# Patient Record
Sex: FEMALE | Race: AMERICAN INDIAN OR ALASKA NATIVE
[De-identification: names, ages, dates, MRNs, and addresses within clinical notes are randomized per-mention and may not be internally consistent; named-entity substitution may affect disease eponyms.]

---

## 2018-02-16 ENCOUNTER — HOSPITAL ENCOUNTER (OUTPATIENT)
Dept: HOSPITAL 5 - TRG | Age: 33
Discharge: HOME | End: 2018-02-16
Attending: OBSTETRICS & GYNECOLOGY
Payer: COMMERCIAL

## 2018-02-16 VITALS — SYSTOLIC BLOOD PRESSURE: 105 MMHG | DIASTOLIC BLOOD PRESSURE: 78 MMHG

## 2018-02-16 DIAGNOSIS — O46.92: Primary | ICD-10-CM

## 2018-02-16 DIAGNOSIS — Z3A.27: ICD-10-CM

## 2018-02-16 LAB
BACTERIA #/AREA URNS HPF: (no result) /HPF
BILIRUB UR QL STRIP: (no result)
BLOOD UR QL VISUAL: (no result)
HYALINE CASTS #/AREA URNS LPF: 5 /LPF
NITRITE UR QL STRIP: (no result)
PH UR STRIP: 6 [PH] (ref 5–7)
PROT UR STRIP-MCNC: (no result) MG/DL
RBC #/AREA URNS HPF: 4 /HPF (ref 0–6)
UROBILINOGEN UR-MCNC: 2 MG/DL (ref ?–2)
WBC #/AREA URNS HPF: 1 /HPF (ref 0–6)

## 2018-02-16 PROCEDURE — 81001 URINALYSIS AUTO W/SCOPE: CPT

## 2018-02-16 PROCEDURE — 59025 FETAL NON-STRESS TEST: CPT

## 2018-05-28 ENCOUNTER — HOSPITAL ENCOUNTER (EMERGENCY)
Dept: HOSPITAL 5 - ED | Age: 33
Discharge: HOME | End: 2018-05-28
Payer: COMMERCIAL

## 2018-05-28 VITALS — DIASTOLIC BLOOD PRESSURE: 94 MMHG | SYSTOLIC BLOOD PRESSURE: 149 MMHG

## 2018-05-28 DIAGNOSIS — J45.909: ICD-10-CM

## 2018-05-28 DIAGNOSIS — N30.01: ICD-10-CM

## 2018-05-28 DIAGNOSIS — R11.2: ICD-10-CM

## 2018-05-28 DIAGNOSIS — R07.89: ICD-10-CM

## 2018-05-28 DIAGNOSIS — K80.20: Primary | ICD-10-CM

## 2018-05-28 DIAGNOSIS — I10: ICD-10-CM

## 2018-05-28 LAB
ALBUMIN SERPL-MCNC: 4.3 G/DL (ref 3.9–5)
ALT SERPL-CCNC: 17 UNITS/L (ref 7–56)
BACTERIA #/AREA URNS HPF: (no result) /HPF
BASOPHILS # (AUTO): 0 K/MM3 (ref 0–0.1)
BASOPHILS NFR BLD AUTO: 0.3 % (ref 0–1.8)
BILIRUB UR QL STRIP: (no result)
BLOOD UR QL VISUAL: (no result)
BUN SERPL-MCNC: 11 MG/DL (ref 7–17)
BUN/CREAT SERPL: 16 %
CALCIUM SERPL-MCNC: 8.7 MG/DL (ref 8.4–10.2)
EOSINOPHIL # BLD AUTO: 0 K/MM3 (ref 0–0.4)
EOSINOPHIL NFR BLD AUTO: 0.6 % (ref 0–4.3)
HCT VFR BLD CALC: 39.4 % (ref 30.3–42.9)
HEMOLYSIS INDEX: 9
HGB BLD-MCNC: 13.1 GM/DL (ref 10.1–14.3)
LYMPHOCYTES # BLD AUTO: 1.8 K/MM3 (ref 1.2–5.4)
LYMPHOCYTES NFR BLD AUTO: 27.2 % (ref 13.4–35)
MCH RBC QN AUTO: 27 PG (ref 28–32)
MCHC RBC AUTO-ENTMCNC: 33 % (ref 30–34)
MCV RBC AUTO: 80 FL (ref 79–97)
MONOCYTES # (AUTO): 0.3 K/MM3 (ref 0–0.8)
MONOCYTES % (AUTO): 4.6 % (ref 0–7.3)
MUCOUS THREADS #/AREA URNS HPF: (no result) /HPF
PH UR STRIP: 5 [PH] (ref 5–7)
PLATELET # BLD: 283 K/MM3 (ref 140–440)
PROT UR STRIP-MCNC: (no result) MG/DL
RBC # BLD AUTO: 4.91 M/MM3 (ref 3.65–5.03)
RBC #/AREA URNS HPF: 4 /HPF (ref 0–6)
UROBILINOGEN UR-MCNC: 4 MG/DL (ref ?–2)
WBC #/AREA URNS HPF: 8 /HPF (ref 0–6)

## 2018-05-28 PROCEDURE — 87086 URINE CULTURE/COLONY COUNT: CPT

## 2018-05-28 PROCEDURE — 96372 THER/PROPH/DIAG INJ SC/IM: CPT

## 2018-05-28 PROCEDURE — 76705 ECHO EXAM OF ABDOMEN: CPT

## 2018-05-28 PROCEDURE — 84702 CHORIONIC GONADOTROPIN TEST: CPT

## 2018-05-28 PROCEDURE — 85025 COMPLETE CBC W/AUTO DIFF WBC: CPT

## 2018-05-28 PROCEDURE — 74019 RADEX ABDOMEN 2 VIEWS: CPT

## 2018-05-28 PROCEDURE — 96365 THER/PROPH/DIAG IV INF INIT: CPT

## 2018-05-28 PROCEDURE — 99284 EMERGENCY DEPT VISIT MOD MDM: CPT

## 2018-05-28 PROCEDURE — 80053 COMPREHEN METABOLIC PANEL: CPT

## 2018-05-28 PROCEDURE — 36415 COLL VENOUS BLD VENIPUNCTURE: CPT

## 2018-05-28 PROCEDURE — 81001 URINALYSIS AUTO W/SCOPE: CPT

## 2018-05-28 NOTE — EMERGENCY DEPARTMENT REPORT
Chief Complaint: Abdominal Pain


Stated Complaint: ABDOMINAL PAIN


Time Seen by Provider: 05/28/18 10:21





- HPI


History of Present Illness: 


32-year-old  female presents to the emergency department with 

complaint of upper abdominal pain since last night.  She had one episode of 

nausea and vomiting.  The pain started off in the lower portion of her chest 

but that has resolved and now it is just in the abdomen.  She has not taken 

anything for her symptoms have visitation.  She is 6 weeks postpartum.  She has 

a history of preeclampsia and hypertension but has not taken her medication yet 

today.  She denies any vaginal bleeding or vaginal discharge, dysuria, fever.  

No recent travel or sick contacts at home.  No history of any intra-abdominal 

surgery.








- ROS


Review of Systems: 


Positive for abdominal pain, nausea, vomiting


Negative for vaginal bleeding or discharge, dysuria or fever








- Exam


Vital Signs: 


 Vital Signs











  05/28/18 05/28/18





  09:12 10:20


 


Temperature 97.6 F 


 


Pulse Rate 87 


 


Respiratory 20 17





Rate  


 


Blood Pressure 170/99 


 


O2 Sat by Pulse 100 





Oximetry  











Physical Exam: 


Heart and lungs sounds are normal to auscultation.  She has upper abdominal 

tenderness to palpation but no guarding.





MSE screening note: 


Focused history and physical exam performed.


Due to findings the following was ordered:





Labs so far been unremarkable including a CBC and CMP.  Patient is not 

pregnant.  Awaiting urinalysis.  We'll obtain an upper abdominal ultrasound and 

a 2 view abdominal x-ray.  EKG is normal without ST elevation MI, ischemia or 

dysrhythmia.





ED Medical Decision Making





- Lab Data


Result diagrams: 


 05/28/18 09:25





 05/28/18 09:25





ED Disposition for MSE


Condition: Stable


Instructions:  Abdominal Pain (ED)


Referrals: 


PRABHA MCGHEE MD [Primary Care Provider] - 3-5 Days

## 2018-05-28 NOTE — EMERGENCY DEPARTMENT REPORT
ED Abdominal Pain HPI





- General


Chief Complaint: Abdominal Pain


Stated Complaint: ABDOMINAL PAIN


Time Seen by Provider: 05/28/18 10:21


Source: patient


Mode of arrival: Ambulatory


Limitations: No Limitations





- History of Present Illness


Initial Comments: 





32-year-old  female presents to the emergency department with 

complaint of upper abdominal pain since last night.  She had one episode of 

nausea and vomiting.  The pain started off in the lower portion of her chest 

but that has resolved and now it is just in the right mid to upper abdomen.  

Abdomen.  Pain feels stabbing in without any radiation.  Pain is 10 out of 10 

and intermittent.  Reports nausea without any vomiting.  Denies any fever or 

chills patient that all of this started after she ate dinner at [employer].  She is also complaining of constipation.  She has not taken anything 

for her symptoms have visitation.  She is 6 weeks postpartum status post 

vaginal delivery.  She has a history of preeclampsia and hypertension but has 

not taken her medication yet today.  She denies any vaginal bleeding or vaginal 

discharge, dysuria, fever.  No recent travel or sick contacts at home.  No 

history of any intra-abdominal surgery.  Denies any shortness of breath or 

chest pain.  Denies any swelling to legs.  Denies any headache or dizziness.





MD Complaint: abdominal pain


-: Last night


Location: RUQ, epigastric


Radiation: none


Migration to: no migration


Severity: severe


Severity scale (0 -10): 10


Quality: sharp


Consistency: intermittent


Improves With: nothing


Worsens With: eating


Context: possible food poisoning


Associated Symptoms: nausea, vomiting, constipation.  denies: diarrhea, fever, 

chills, dysuria, hematemesis, hematochezia, melena, hematuria, anorexia, syncope


Treatments Prior to Arrival: other (none)





- Related Data


LMP Date: 05/28/18


LMP (females 10-50): other (6 weeks postpartum)


 Home Medications











 Medication  Instructions  Recorded  Confirmed  Last Taken


 


Pnv No.95/Ferrous Fum/Folic AC 1 tab PO DAILY 02/16/18 02/16/18 02/13/18





[Prenatal Vitamins Tablet]    








 Previous Rx's











 Medication  Instructions  Recorded  Last Taken  Type


 


Acetaminophen [Non-Aspirin Pain 500 mg PO Q6H PRN #12 tablet 05/28/18 Unknown Rx





Relief]    


 


Ciprofloxacin HCl [Cipro] 500 mg PO Q12H 10 Days #20 tablet 05/28/18 Unknown Rx


 


Promethazine [Phenergan TAB] 25 mg PO Q6HR PRN #12 tab 05/28/18 Unknown Rx











 Allergies











Allergy/AdvReac Type Severity Reaction Status Date / Time


 


ibuprofen AdvReac Severe Anaphylaxis Verified 05/28/18 13:42














ED Review of Systems


ROS: 


Stated complaint: ABDOMINAL PAIN


Other details as noted in HPI





Constitutional: denies: chills, fever


Eyes: denies: eye pain, eye discharge, vision change


ENT: denies: ear pain, throat pain


Respiratory: denies: cough, shortness of breath, SOB with exertion, SOB at rest

, stridor, wheezing


Cardiovascular: denies: chest pain, palpitations, edema, syncope


Gastrointestinal: abdominal pain, nausea, vomiting.  denies: diarrhea, 

constipation, hematemesis, melena, hematochezia


Genitourinary: denies: urgency, dysuria, frequency, hematuria, discharge


Musculoskeletal: denies: back pain, joint swelling, arthralgia


Skin: denies: rash, lesions


Neurological: denies: headache, weakness, numbness, paresthesias, confusion, 

abnormal gait, vertigo





ED Past Medical Hx





- Past Medical History


Previous Medical History?: Yes


Hx Hypertension: Yes


Hx Diabetes: No


Hx Deep Vein Thrombosis: No


Hx Renal Disease: No


Hx Sickle Cell Disease: No


Hx Seizures: No


Hx Asthma: Yes (NO INHALER)


Hx HIV: No





- Surgical History


Past Surgical History?: No





- Family History


Family history: hypertension





- Social History


Smoking Status: Never Smoker


Substance Use Type: None


Other Social History: 





Lives with family








- Medications


Home Medications: 


 Home Medications











 Medication  Instructions  Recorded  Confirmed  Last Taken  Type


 


Pnv No.95/Ferrous Fum/Folic AC 1 tab PO DAILY 02/16/18 02/16/18 02/13/18 History





[Prenatal Vitamins Tablet]     


 


Acetaminophen [Non-Aspirin Pain 500 mg PO Q6H PRN #12 tablet 05/28/18  Unknown 

Rx





Relief]     


 


Ciprofloxacin HCl [Cipro] 500 mg PO Q12H 10 Days #20 tablet 05/28/18  Unknown Rx


 


Promethazine [Phenergan TAB] 25 mg PO Q6HR PRN #12 tab 05/28/18  Unknown Rx














ED Physical Exam





- General


Limitations: No Limitations


General appearance: alert, in no apparent distress





- Head


Head exam: Present: atraumatic, normocephalic, normal inspection, other (no c 

spine tenderness)





- Eye


Eye exam: Present: normal appearance, PERRL, EOMI


Pupils: Present: normal accommodation





- ENT


ENT exam: Present: normal exam, normal orophraynx, mucous membranes moist, TM's 

normal bilaterally, normal external ear exam





- Neck


Neck exam: Present: normal inspection, full ROM, other (no c-spine tenderness).

  Absent: tenderness, lymphadenopathy





- Respiratory


Respiratory exam: Present: normal lung sounds bilaterally.  Absent: respiratory 

distress, chest wall tenderness, accessory muscle use





- Cardiovascular


Cardiovascular Exam: Present: regular rate, normal rhythm, normal heart sounds.

  Absent: systolic murmur, diastolic murmur





- GI/Abdominal


GI/Abdominal exam: Present: soft, tenderness (right upper quadrant), guarding, 

normal bowel sounds.  Absent: distended, rebound, rigid, organomegaly, mass, 

bruit, pulsatile mass, hernia





- Expanded GI/Abdominal Exam


  ** Expanded


GI/Abdominal exam: Present: Juarez's sign.  Absent: psoas sign, obturator sign, 

ascites





- Extremities Exam


Extremities exam: Present: normal inspection, full ROM, normal capillary refill

, other (no clubbing, cyanosis or edema.  +2 pulses all extremities.  No 

neurovascular compromise).  Absent: tenderness, pedal edema, joint swelling, 

calf tenderness





- Back Exam


Back exam: Present: normal inspection, full ROM, other (ambulates without any 

difficulties).  Absent: tenderness, CVA tenderness (R), CVA tenderness (L), 

muscle spasm, paraspinal tenderness, vertebral tenderness, rash noted





- Neurological Exam


Neurological exam: Present: alert, oriented X3, normal gait





- Psychiatric


Psychiatric exam: Present: normal affect, normal mood





- Skin


Skin exam: Present: warm, dry, intact, normal color.  Absent: rash





ED Course


 Vital Signs











  05/28/18 05/28/18 05/28/18





  09:12 10:20 15:10


 


Temperature 97.6 F  98.2 F


 


Pulse Rate 87  66


 


Respiratory 20 17 18





Rate   


 


Blood Pressure 170/99  


 


Blood Pressure   149/94





[Left]   


 


O2 Sat by Pulse 100  98





Oximetry   














- Reevaluation(s)


Reevaluation #1: 





05/28/18 13:10


Toradol 60 mg IM and Zofran 8 mg ODT.  Patient is alert quadrant right to mid 

tenderness to palpation with positive guarding.  No nausea or vomiting.  No 

radiation of pain.








Reevaluation #2: 





05/28/18 13:25


Ultrasound of abdomen right upper quadrant shows patient with cholelithiasis 

with concern for acute cholecystitis.  Positive gallbladder distention 

containing small gallstones.  There is a tiny amount of anny-cholecystic fluid.

  Positive gallbladder wall thickening.  Positive sonographic Juarez sign.


Disposition the patient and she is not very happy.  She says she has no one to 

take care for baby.  Patient is 6 weeks postpartum.  Pain is not controlled 

with Toradol IM therefore IV fluid normal saline be started.  I spoke with Dr. Natarajan regarding ultrasound findings.  Will consult surgery and gastroenterology.




















05/28/18 15:49





Reevaluation #3: 





05/28/18 15:46


I spoke with patient regarding in chest sounds abdomen findings and that I 

spoke with surgeon and gastroenterologist


05/28/18 15:49








- Consultations


Consultation #1: 





05/28/18 13:29


[facility]-consulted and surgery Dr. Ricardo








Consultation #2: 





05/28/18 15:45


I spoke with Dr. Giraldo who is on call for gastroenterologist and he is aware.  

Patient's CT findings and the patient does not want to stay and he reports that 

patient can follow up with his office at [facility] tomorrow.





Spoke with Dr. Grover and is also aware of patient CT scan findings but also 

where the patient refusing to be admitted to the hospital for management so 

therefore he said that patient can follow-up with him because if she is 

refusing to be seen that there is nothing he can do about it.





ED Medical Decision Making





- Lab Data


Result diagrams: 


 05/28/18 09:25





 05/28/18 09:25








 Lab Results











  05/28/18 05/28/18 05/28/18 Range/Units





  09:25 09:25 09:25 


 


WBC  6.6    (4.5-11.0)  K/mm3


 


RBC  4.91    (3.65-5.03)  M/mm3


 


Hgb  13.1    (10.1-14.3)  gm/dl


 


Hct  39.4    (30.3-42.9)  %


 


MCV  80    (79-97)  fl


 


MCH  27 L    (28-32)  pg


 


MCHC  33    (30-34)  %


 


RDW  16.5 H    (13.2-15.2)  %


 


Plt Count  283    (140-440)  K/mm3


 


Lymph % (Auto)  27.2    (13.4-35.0)  %


 


Mono % (Auto)  4.6    (0.0-7.3)  %


 


Eos % (Auto)  0.6    (0.0-4.3)  %


 


Baso % (Auto)  0.3    (0.0-1.8)  %


 


Lymph #  1.8    (1.2-5.4)  K/mm3


 


Mono #  0.3    (0.0-0.8)  K/mm3


 


Eos #  0.0    (0.0-0.4)  K/mm3


 


Baso #  0.0    (0.0-0.1)  K/mm3


 


Seg Neutrophils %  67.3    (40.0-70.0)  %


 


Seg Neutrophils #  4.4    (1.8-7.7)  K/mm3


 


Sodium   137   (137-145)  mmol/L


 


Potassium   3.8   (3.6-5.0)  mmol/L


 


Chloride   102.4   ()  mmol/L


 


Carbon Dioxide   28   (22-30)  mmol/L


 


Anion Gap   10   mmol/L


 


BUN   11   (7-17)  mg/dL


 


Creatinine   0.7   (0.7-1.2)  mg/dL


 


Estimated GFR   > 60   ml/min


 


BUN/Creatinine Ratio   16   %


 


Glucose   88   ()  mg/dL


 


Calcium   8.7   (8.4-10.2)  mg/dL


 


Total Bilirubin   0.30   (0.1-1.2)  mg/dL


 


AST   26   (5-40)  units/L


 


ALT   17   (7-56)  units/L


 


Alkaline Phosphatase   117   ()  units/L


 


Total Protein   7.7   (6.3-8.2)  g/dL


 


Albumin   4.3   (3.9-5)  g/dL


 


Albumin/Globulin Ratio   1.3   %


 


HCG, Quant    < 2  (0-4)  mIU/mL


 


Urine Color     (Yellow)  


 


Urine Turbidity     (Clear)  


 


Urine pH     (5.0-7.0)  


 


Ur Specific Gravity     (1.003-1.030)  


 


Urine Protein     (Negative)  mg/dL


 


Urine Glucose (UA)     (Negative)  mg/dL


 


Urine Ketones     (Negative)  mg/dL


 


Urine Blood     (Negative)  


 


Urine Nitrite     (Negative)  


 


Urine Bilirubin     (Negative)  


 


Urine Urobilinogen     (<2.0)  mg/dL


 


Ur Leukocyte Esterase     (Negative)  


 


Urine WBC (Auto)     (0.0-6.0)  /HPF


 


Urine RBC (Auto)     (0.0-6.0)  /HPF


 


U Epithel Cells (Auto)     (0-13.0)  /HPF


 


Urine Bacteria (Auto)     (Negative)  /HPF


 


Urine Mucus     /HPF














  05/28/18 Range/Units





  09:33 


 


WBC   (4.5-11.0)  K/mm3


 


RBC   (3.65-5.03)  M/mm3


 


Hgb   (10.1-14.3)  gm/dl


 


Hct   (30.3-42.9)  %


 


MCV   (79-97)  fl


 


MCH   (28-32)  pg


 


MCHC   (30-34)  %


 


RDW   (13.2-15.2)  %


 


Plt Count   (140-440)  K/mm3


 


Lymph % (Auto)   (13.4-35.0)  %


 


Mono % (Auto)   (0.0-7.3)  %


 


Eos % (Auto)   (0.0-4.3)  %


 


Baso % (Auto)   (0.0-1.8)  %


 


Lymph #   (1.2-5.4)  K/mm3


 


Mono #   (0.0-0.8)  K/mm3


 


Eos #   (0.0-0.4)  K/mm3


 


Baso #   (0.0-0.1)  K/mm3


 


Seg Neutrophils %   (40.0-70.0)  %


 


Seg Neutrophils #   (1.8-7.7)  K/mm3


 


Sodium   (137-145)  mmol/L


 


Potassium   (3.6-5.0)  mmol/L


 


Chloride   ()  mmol/L


 


Carbon Dioxide   (22-30)  mmol/L


 


Anion Gap   mmol/L


 


BUN   (7-17)  mg/dL


 


Creatinine   (0.7-1.2)  mg/dL


 


Estimated GFR   ml/min


 


BUN/Creatinine Ratio   %


 


Glucose   ()  mg/dL


 


Calcium   (8.4-10.2)  mg/dL


 


Total Bilirubin   (0.1-1.2)  mg/dL


 


AST   (5-40)  units/L


 


ALT   (7-56)  units/L


 


Alkaline Phosphatase   ()  units/L


 


Total Protein   (6.3-8.2)  g/dL


 


Albumin   (3.9-5)  g/dL


 


Albumin/Globulin Ratio   %


 


HCG, Quant   (0-4)  mIU/mL


 


Urine Color  Yellow  (Yellow)  


 


Urine Turbidity  Clear  (Clear)  


 


Urine pH  5.0  (5.0-7.0)  


 


Ur Specific Gravity  1.023  (1.003-1.030)  


 


Urine Protein  <15 mg/dl  (Negative)  mg/dL


 


Urine Glucose (UA)  Neg  (Negative)  mg/dL


 


Urine Ketones  Neg  (Negative)  mg/dL


 


Urine Blood  Lg  (Negative)  


 


Urine Nitrite  Neg  (Negative)  


 


Urine Bilirubin  Neg  (Negative)  


 


Urine Urobilinogen  4.0  (<2.0)  mg/dL


 


Ur Leukocyte Esterase  Neg  (Negative)  


 


Urine WBC (Auto)  8.0 H  (0.0-6.0)  /HPF


 


Urine RBC (Auto)  4.0  (0.0-6.0)  /HPF


 


U Epithel Cells (Auto)  3.0  (0-13.0)  /HPF


 


Urine Bacteria (Auto)  1+  (Negative)  /HPF


 


Urine Mucus  3+  /HPF








Urine culture sent and pending


She is currently on her menses





- EKG Data


-: EKG Interpreted by Me (interpreted by Dr. Fairchild)


EKG shows normal: sinus rhythm (70)


Rate: normal





- EKG Data


Interpretation: no acute changes, normal EKG





- Radiology Data


Radiology results: report reviewed





Ultrasound abdomen reveals cholelithiasis with concerns for a cholecystitis.  

Positive bowel notation, positive wall thickening, positive gallstones.  

Positive Juarez sign on ultrasound exam.





- Medical Decision Making





ED course:





Diagnosis


1: Abdominal pain right upper quadrant-resolved.  Patient given Toradol 6 mg IM 

and will discharge home on pain med


2:Nausea and vomiting: Better.  Patient given Zofran 8 mg ODT and we'll 

discharge home on Zofran.  IV fluid times


3: Urinary tract infection: Patient will be discharged home on ciprofloxacin.  

Urine culture sent


4: Gallstones with contrast questionable cholecystitis without obstruction : IV 

Zosyn 4.5 g IV given and will be discharged home in antibiotic to cover 

cystitis and possible cholecystitis.


Ultrasound abdomen, right upper quadrant reveals cholelithiasis with concerns 

for a cholecystitis.  Positive bowel notation, positive wall thickening, 

positive gallstones.  Positive Juarez sign on ultrasound exam.


Ultrasound report reviewed and a smoker radiologist regarding reports.





CBC stable, CMP stable, negative pregnancy urinalysis with positive cystitis 

and patient large amount of blood due to menses.  See laboratory section for 

details on laboratory results.





Patient educated on ultrasound results and also lab results.  I discussed 

diagnosis and treatment plan with her.    


Dr natarajan had screened patient and place orders.  I spoke with him regarding 

results and surgery Dr. Ricardo and gastroenterologists consulted.  Patient had 

decided that she does not want to stay in hospital because she has a 6-week-old 

baby and she doesn't have anybody to take here for child so she is adamant that 

she will not be admitted.


Surgery and GI consulted and they decided that they cannot force patient states 

that she can follow up as outpatient.





See documentation for AMA signed by patient because she was encouraged to be 

admitted and she refused.





Patient educated on diagnosis and signs and symptoms of worsening cholecystitis 

and when she needs to come back to emergency if Asap and she voiced 

understanding.





Prescription: Ciprofloxacin this will cover cystitis and cholecystitis.  

Patient instructed that she needs to refrain from breast-feed and until 

completed Cipro.  Patient alternate with breast-feeding and bottlefeeding.  She 

said the patient has been bottlefeeding but she still pumps.  I told her to 

continue pump.  But not to breast feed child until 5 days after completed 

ciprofloxacin.  She was also discharged home on promethazine and Tylenol plain 

for pain.


Patient reports that she has appointment with her doctor tomorrow and that she 

will return to the emergency room if she feels worse but right now she has to 

go home.





DC home from emergency room in stable condition.  Vital signs are stable and 

she is afebrile.








Critical care attestation.: 


If time is entered above; I have spent that time in minutes in the direct care 

of this critically ill patient, excluding procedure time.








ED Disposition


Clinical Impression: 


 Gallstones and inflammation of gallbladder without obstruction, Atypical chest 

pain





Abdominal pain


Qualifiers:


 Abdominal location: right upper quadrant Qualified Code(s): R10.11 - Right 

upper quadrant pain





Nausea & vomiting


Qualifiers:


 Vomiting type: unspecified Vomiting Intractability: non-intractable Qualified 

Code(s): R11.2 - Nausea with vomiting, unspecified





Acute cystitis


Qualifiers:


 Hematuria presence: with hematuria Qualified Code(s): N30.01 - Acute cystitis 

with hematuria





Disposition: DC-01 TO HOME OR SELFCARE


Is pt being admited?: No


Does the pt Need Aspirin: No


Condition: Stable


Instructions:  Chest Pain (ED), Cholecystitis (ED), Biliary Colic (ED), Urinary 

Tract Infection in Women (ED), Acute Nausea and Vomiting (ED), Abdominal Pain (

ED)


Additional Instructions: 


Please return to the hospital if he developed increase in abdominal pain, 

nausea and vomiting that is not relieved by medication, fever and/or chills, 

weakness, dizziness.  He have gallstones and also inflammation of gallbladder 

and this could become worse and lead to infection in your blood . if you 

develop any symptoms that's mentioned above and discussed you need to seek 

medical attention ASAP and return to emergency room.  He decided to leave 

AGAINST MEDICAL ADVICE for admission to hospital.  Please follow advice and 

return as discussed


Please avoid breast-feeding and give your baby formula as discussed until he 

completes course of antibiotic.  Do not start breast-feeding until 6 hours 

after last dose of antibiotic.


Take Phenergan for nausea and/or vomiting


Take Tylenol for pain. Please do not drive or operate heavy machinery while 

taking this medication as it causes drowsiness.


Increase  fluid intake


See  referral to gastroenterologist and surgeon.  These call tomorrow to 

schedule an appointment for follow-up visit for gallbladder disease and 

infection





Prescriptions: 


Acetaminophen [Non-Aspirin Pain Relief] 500 mg PO Q6H PRN #12 tablet


 PRN Reason: Pain


Ciprofloxacin HCl [Cipro] 500 mg PO Q12H 10 Days #20 tablet


Promethazine [Phenergan TAB] 25 mg PO Q6HR PRN #12 tab


 PRN Reason: Nausea


Referrals: 


PRABHA MCGHEE MD [Primary Care Provider] - 05/29/18


[facility] [Provider Group] - 05/29/18


JASON RICARDO MD [Staff Physician] - 05/29/18


Forms:  AMA Form

## 2018-05-28 NOTE — ULTRASOUND REPORT
FINAL REPORT



EXAM:  US ABDOMEN LIMITED



HISTORY:  Upper abd pain 



TECHNIQUE:  Grayscale and color doppler ultrasound imaging of the

right upper quadrant was performed.



PRIORS:  None.



FINDINGS:  

Liver: The liver is normal in echogenicity. No focal hepatic

lesions or intrahepatic biliary ductal dilation. 



Gallbladder/Biliary system: The gallbladder is distended

containing small gallstones. There is a tiny amount of

pericholecystic fluid. The gallbladder wall is thickened

measuring 4 millimeters. Positive sonographic Juarez sign was

noted by the ultrasonographer. The common bile duct measures 5

millimeters.



Right kidney: The right kidney is normal in echogenicity without

hydronephrosis, cyst, mass or calcification. The right kidney

measures 10.6 x 4.1 x 4.3 centimeters. 



Pancreas: The visualized portions of the pancreas demonstrate no

focal lesion.



Aorta/IVC: The visualized portions of the abdominal aorta and IVC

are normal in caliber.



Free fluid: None.



IMPRESSION:  

Cholelithiasis with findings concerning for acute cholecystitis.

## 2018-05-30 NOTE — XRAY REPORT
ABDOMEN, 2 views:



History: Abdominal pain.



There is no evidence of free air beneath the diaphragms.  The gas 

pattern within the abdomen is unremarkable. There is no evidence of 

bowel dilatation, significant air-fluid levels, or pathologic 

calcifications.  There is moderate stool throughout the length of the 

colon. Organ shadows are unremarkable.



IMPRESSION:

Fecal retention.

## 2018-06-01 ENCOUNTER — HOSPITAL ENCOUNTER (EMERGENCY)
Dept: HOSPITAL 5 - ED | Age: 33
Discharge: LEFT BEFORE DISCHARGE | End: 2018-06-01
Payer: COMMERCIAL

## 2018-06-01 VITALS — DIASTOLIC BLOOD PRESSURE: 102 MMHG | SYSTOLIC BLOOD PRESSURE: 173 MMHG

## 2018-06-01 DIAGNOSIS — R10.9: Primary | ICD-10-CM

## 2018-06-01 DIAGNOSIS — Z53.21: ICD-10-CM

## 2018-06-01 LAB
ALBUMIN SERPL-MCNC: 4.5 G/DL (ref 3.9–5)
ALT SERPL-CCNC: 57 UNITS/L (ref 7–56)
BASOPHILS # (AUTO): 0 K/MM3 (ref 0–0.1)
BASOPHILS NFR BLD AUTO: 0.6 % (ref 0–1.8)
BUN SERPL-MCNC: 7 MG/DL (ref 7–17)
BUN/CREAT SERPL: 9 %
CALCIUM SERPL-MCNC: 9.4 MG/DL (ref 8.4–10.2)
EOSINOPHIL # BLD AUTO: 0.1 K/MM3 (ref 0–0.4)
EOSINOPHIL NFR BLD AUTO: 1.1 % (ref 0–4.3)
HCT VFR BLD CALC: 37.9 % (ref 30.3–42.9)
HEMOLYSIS INDEX: 7
HGB BLD-MCNC: 12.3 GM/DL (ref 10.1–14.3)
LYMPHOCYTES # BLD AUTO: 1.7 K/MM3 (ref 1.2–5.4)
LYMPHOCYTES NFR BLD AUTO: 31.7 % (ref 13.4–35)
MCH RBC QN AUTO: 26 PG (ref 28–32)
MCHC RBC AUTO-ENTMCNC: 33 % (ref 30–34)
MCV RBC AUTO: 81 FL (ref 79–97)
MONOCYTES # (AUTO): 0.2 K/MM3 (ref 0–0.8)
MONOCYTES % (AUTO): 4.7 % (ref 0–7.3)
PLATELET # BLD: 287 K/MM3 (ref 140–440)
RBC # BLD AUTO: 4.7 M/MM3 (ref 3.65–5.03)

## 2018-06-01 PROCEDURE — 80053 COMPREHEN METABOLIC PANEL: CPT

## 2018-06-01 PROCEDURE — 85025 COMPLETE CBC W/AUTO DIFF WBC: CPT

## 2018-06-01 PROCEDURE — 36415 COLL VENOUS BLD VENIPUNCTURE: CPT

## 2018-06-01 PROCEDURE — 84703 CHORIONIC GONADOTROPIN ASSAY: CPT

## 2018-06-08 ENCOUNTER — HOSPITAL ENCOUNTER (OUTPATIENT)
Dept: HOSPITAL 5 - OR | Age: 33
Discharge: HOME | End: 2018-06-08
Attending: SURGERY
Payer: COMMERCIAL

## 2018-06-08 VITALS — DIASTOLIC BLOOD PRESSURE: 93 MMHG | SYSTOLIC BLOOD PRESSURE: 138 MMHG

## 2018-06-08 DIAGNOSIS — K82.9: Primary | ICD-10-CM

## 2018-06-08 DIAGNOSIS — J45.909: ICD-10-CM

## 2018-06-08 LAB
ALBUMIN SERPL-MCNC: 4 G/DL (ref 3.9–5)
ALT SERPL-CCNC: 15 UNITS/L (ref 7–56)
BASOPHILS # (AUTO): 0 K/MM3 (ref 0–0.1)
BASOPHILS NFR BLD AUTO: 0.2 % (ref 0–1.8)
BUN SERPL-MCNC: 8 MG/DL (ref 7–17)
BUN/CREAT SERPL: 11 %
CALCIUM SERPL-MCNC: 8.8 MG/DL (ref 8.4–10.2)
EOSINOPHIL # BLD AUTO: 0.1 K/MM3 (ref 0–0.4)
EOSINOPHIL NFR BLD AUTO: 1.5 % (ref 0–4.3)
HCT VFR BLD CALC: 36.3 % (ref 30.3–42.9)
HEMOLYSIS INDEX: 2
HGB BLD-MCNC: 12.1 GM/DL (ref 10.1–14.3)
LYMPHOCYTES # BLD AUTO: 2.3 K/MM3 (ref 1.2–5.4)
LYMPHOCYTES NFR BLD AUTO: 52.7 % (ref 13.4–35)
MCH RBC QN AUTO: 27 PG (ref 28–32)
MCHC RBC AUTO-ENTMCNC: 33 % (ref 30–34)
MCV RBC AUTO: 80 FL (ref 79–97)
MONOCYTES # (AUTO): 0.2 K/MM3 (ref 0–0.8)
MONOCYTES % (AUTO): 5.8 % (ref 0–7.3)
PLATELET # BLD: 287 K/MM3 (ref 140–440)
RBC # BLD AUTO: 4.53 M/MM3 (ref 3.65–5.03)

## 2018-06-08 PROCEDURE — 47562 LAPAROSCOPIC CHOLECYSTECTOMY: CPT

## 2018-06-08 PROCEDURE — 81025 URINE PREGNANCY TEST: CPT

## 2018-06-08 PROCEDURE — 80053 COMPREHEN METABOLIC PANEL: CPT

## 2018-06-08 PROCEDURE — 85025 COMPLETE CBC W/AUTO DIFF WBC: CPT

## 2018-06-08 PROCEDURE — A4217 STERILE WATER/SALINE, 500 ML: HCPCS

## 2018-06-08 PROCEDURE — 88304 TISSUE EXAM BY PATHOLOGIST: CPT

## 2018-06-08 PROCEDURE — 36415 COLL VENOUS BLD VENIPUNCTURE: CPT

## 2018-06-08 PROCEDURE — 82150 ASSAY OF AMYLASE: CPT

## 2018-06-08 RX ADMIN — HYDROMORPHONE HYDROCHLORIDE PRN MG: 1 INJECTION, SOLUTION INTRAMUSCULAR; INTRAVENOUS; SUBCUTANEOUS at 16:10

## 2018-06-08 RX ADMIN — HYDROMORPHONE HYDROCHLORIDE PRN MG: 1 INJECTION, SOLUTION INTRAMUSCULAR; INTRAVENOUS; SUBCUTANEOUS at 18:05

## 2018-06-08 RX ADMIN — HYDROMORPHONE HYDROCHLORIDE PRN MG: 1 INJECTION, SOLUTION INTRAMUSCULAR; INTRAVENOUS; SUBCUTANEOUS at 16:20

## 2018-06-08 RX ADMIN — HYDROMORPHONE HYDROCHLORIDE PRN MG: 1 INJECTION, SOLUTION INTRAMUSCULAR; INTRAVENOUS; SUBCUTANEOUS at 16:50

## 2018-06-08 NOTE — OPERATIVE REPORT
PREOPERATIVE DIAGNOSIS:  Gallbladder disease.



POSTOPERATIVE DIAGNOSIS:  Gallbladder disease.



PROCEDURE:  Laparoscopic cholecystectomy.



SURGEON:  Gabriel Ricardo M.D.



ASSISTANT:  Dr. Lara.



ANESTHESIA:  General.



ESTIMATED BLOOD LOSS:  Minimal.



DRAINS:  None.



COMPLICATIONS:  None.



DESCRIPTION OF PROCEDURE:  The patient was taken to the operating room, prepped

and draped in a sterile fashion.  A Veress needle was inserted and CO2

insufflation begun.  A 5 mm trocar was then inserted and camera inserted.  All

other trocars were inserted under direct visualization.  Gallbladder was then

grasped at the fundus and infundibulum and retracted towards the right

subphrenic space.  Dissection was then carried out along Calot's triangle.  The

cystic duct and artery were delineated in their entire course.  Both were then

doubly clipped and transected.  Hook electrocautery was used to dissect the

gallbladder from the overlying liver bed.  Prior to complete removal, the liver

bed was inspected for bleeding and noted to be dry.  The cystic duct and artery

stumps were once again visualized.  The clips were noted to be securely in place

with no evidence of bleeding or bile leak.  Gallbladder was then completely

freed and brought out through the subxiphoid trocar.  This area was inspected

for bleeding and noted to be dry.  Subxiphoid trocar was then reinserted.  All

other 5 mm ports were removed under direct visualization.  No bleeding or oozing

noted.  The subxiphoid trocar was then used to expel the CO2 and the trocar was

removed.  The fascia at this site was closed with a figure-of-eight 0 Vicryl

suture.  The skin at all port sites was closed with subcuticular 4-0 Vicryl.  A

0.5% Marcaine was infiltrated over the port site for postoperative pain relief. 

The patient tolerated the procedure well and left OR in stable condition.





DD: 06/08/2018 15:34

DT: 06/08/2018 16:52

JOB# [id number]  [id number]

AUGUST/MINGO

## 2018-06-08 NOTE — ANESTHESIA CONSULTATION
Anesthesia Consult and Med Hx


Date of service: 06/08/18





- Airway


Anesthetic Teeth Evaluation: Good


ROM Head & Neck: Adequate


Mental/Hyoid Distance: Adequate


Mallampati Class: Class I


Intubation Access Assessment: Good





- Pulmonary Exam


CTA: Yes





- Cardiac Exam


Cardiac Exam: RRR





- Pre-Operative Health Status


ASA Pre-Surgery Classification: ASA2


Proposed Anesthetic Plan: General





- Pulmonary


Hx Asthma: Yes (NO INHALER)





- Cardiovascular System


Hx Hypertension: Yes (DUE TO PREGNANCY,10 MOS.)





- Central Nervous System


Hx Seizures: No


Hx Psychiatric Problems: No





- Endocrine


Hx Renal Disease: No


Hx Hypothyroidism: No


Hx Hyperthyroidism: No





- Hematic


Hx Anemia: No


Hx Sickle Cell Disease: No





- Other Systems


Hx Alcohol Use: No


Hx Substance Use: No


Hx Cancer: No

## 2018-06-08 NOTE — DISCHARGE SUMMARY
Short Stay Discharge Plan


Activity: other (observe x 4 hrs then may d/c if stable.  ice chips today, cl 

liq in am.  solid low fat diet in 48hrs.  keep dressings dry x 5 days.  no 

lifting over 5 lbs x 2 wks)


Weight Bearing Status: Partial Weight Bearing


Diet: other


Wound: keep clean and dry


Follow up with: 


JASON HERNANDEZ MD [Staff Physician] - 7 Days